# Patient Record
Sex: FEMALE | Race: WHITE | HISPANIC OR LATINO | Employment: UNEMPLOYED | URBAN - METROPOLITAN AREA
[De-identification: names, ages, dates, MRNs, and addresses within clinical notes are randomized per-mention and may not be internally consistent; named-entity substitution may affect disease eponyms.]

---

## 2017-03-04 ENCOUNTER — HOSPITAL ENCOUNTER (EMERGENCY)
Facility: MEDICAL CENTER | Age: 37
End: 2017-03-04
Attending: EMERGENCY MEDICINE

## 2017-03-04 ENCOUNTER — HOSPITAL ENCOUNTER (EMERGENCY)
Facility: MEDICAL CENTER | Age: 37
End: 2017-03-05

## 2017-03-04 VITALS
HEIGHT: 65 IN | RESPIRATION RATE: 18 BRPM | BODY MASS INDEX: 24.99 KG/M2 | DIASTOLIC BLOOD PRESSURE: 98 MMHG | SYSTOLIC BLOOD PRESSURE: 108 MMHG | HEART RATE: 130 BPM | TEMPERATURE: 98.8 F | WEIGHT: 150 LBS

## 2017-03-04 VITALS
RESPIRATION RATE: 18 BRPM | SYSTOLIC BLOOD PRESSURE: 138 MMHG | HEART RATE: 128 BPM | DIASTOLIC BLOOD PRESSURE: 91 MMHG | OXYGEN SATURATION: 97 % | BODY MASS INDEX: 24.41 KG/M2 | TEMPERATURE: 99.1 F | WEIGHT: 151.9 LBS | HEIGHT: 66 IN

## 2017-03-04 VITALS
OXYGEN SATURATION: 97 % | BODY MASS INDEX: 23.46 KG/M2 | HEIGHT: 67 IN | DIASTOLIC BLOOD PRESSURE: 100 MMHG | WEIGHT: 149.47 LBS | TEMPERATURE: 98.4 F | RESPIRATION RATE: 20 BRPM | HEART RATE: 126 BPM | SYSTOLIC BLOOD PRESSURE: 130 MMHG

## 2017-03-04 PROCEDURE — 302449 STATCHG TRIAGE ONLY (STATISTIC)

## 2017-03-04 ASSESSMENT — PAIN SCALES - GENERAL
PAINLEVEL_OUTOF10: 10
PAINLEVEL_OUTOF10: 10
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
## 2017-03-05 NOTE — ED PROVIDER NOTES
Progress Note:  I signed up to see this patient however they were not present in the room.  According to nursing, the pt had left without being seen by a provider.  I was unable to see the pt prior to her leaving.

## 2017-03-05 NOTE — ED NOTES
"Pt comes out of room yelling at staff demanding doctor. Explained to pt ERP has signed up and will be over to see. Encouraged pt to cooperate. Pt ambulates with steady gait out, gives nursing staff the middle finger yelling \"fuck you guys\".  Pt now walks with steady gait, no shaking or tremors.   "

## 2017-03-05 NOTE — ED NOTES
"Chief Complaint   Patient presents with   • Medication Refill     Patient ambulatory to triage. Patient began to lecture this RN that she needs her medications refilled or she is going to die. Patient then stated that \"you guys don't know anything about me\", and \"what kind of hospital doesn't give a patient their medications, if you aren't going to give them to me then I will just leave\". Charge RN aware of this patient as they have been here three times today.   "
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
